# Patient Record
Sex: FEMALE | Race: WHITE
[De-identification: names, ages, dates, MRNs, and addresses within clinical notes are randomized per-mention and may not be internally consistent; named-entity substitution may affect disease eponyms.]

---

## 2018-04-05 ENCOUNTER — HOSPITAL ENCOUNTER (EMERGENCY)
Dept: HOSPITAL 62 - ER | Age: 33
Discharge: HOME | End: 2018-04-05
Payer: COMMERCIAL

## 2018-04-05 VITALS — SYSTOLIC BLOOD PRESSURE: 108 MMHG | DIASTOLIC BLOOD PRESSURE: 86 MMHG

## 2018-04-05 DIAGNOSIS — R63.0: ICD-10-CM

## 2018-04-05 DIAGNOSIS — K52.9: ICD-10-CM

## 2018-04-05 DIAGNOSIS — O21.2: ICD-10-CM

## 2018-04-05 DIAGNOSIS — R10.31: ICD-10-CM

## 2018-04-05 DIAGNOSIS — Z87.891: ICD-10-CM

## 2018-04-05 DIAGNOSIS — Z3A.37: ICD-10-CM

## 2018-04-05 DIAGNOSIS — O26.893: ICD-10-CM

## 2018-04-05 DIAGNOSIS — O99.613: Primary | ICD-10-CM

## 2018-04-05 LAB
ABSOLUTE LYMPHOCYTES# (MANUAL): 0.7 10^3/UL (ref 0.5–4.7)
ABSOLUTE MONOCYTES # (MANUAL): 0.9 10^3/UL (ref 0.1–1.4)
ABSOLUTE NEUTROPHILS# (MANUAL): 16.5 10^3/UL (ref 1.7–8.2)
ADD MANUAL DIFF: YES
ALBUMIN SERPL-MCNC: 3.9 G/DL (ref 3.5–5)
ALP SERPL-CCNC: 157 U/L (ref 38–126)
ALT SERPL-CCNC: 25 U/L (ref 9–52)
ANION GAP SERPL CALC-SCNC: 8 MMOL/L (ref 5–19)
APPEARANCE UR: (no result)
APTT PPP: YELLOW S
AST SERPL-CCNC: 15 U/L (ref 14–36)
BASOPHILS NFR BLD MANUAL: 0 % (ref 0–2)
BILIRUB DIRECT SERPL-MCNC: 0.1 MG/DL (ref 0–0.4)
BILIRUB SERPL-MCNC: 0.4 MG/DL (ref 0.2–1.3)
BILIRUB UR QL STRIP: NEGATIVE
BUN SERPL-MCNC: 7 MG/DL (ref 7–20)
CALCIUM: 9.2 MG/DL (ref 8.4–10.2)
CHLORIDE SERPL-SCNC: 107 MMOL/L (ref 98–107)
CO2 SERPL-SCNC: 23 MMOL/L (ref 22–30)
EOSINOPHIL NFR BLD MANUAL: 0 % (ref 0–6)
ERYTHROCYTE [DISTWIDTH] IN BLOOD BY AUTOMATED COUNT: 12.9 % (ref 11.5–14)
GLUCOSE SERPL-MCNC: 82 MG/DL (ref 75–110)
GLUCOSE UR STRIP-MCNC: NEGATIVE MG/DL
HCT VFR BLD CALC: 40.6 % (ref 36–47)
HGB BLD-MCNC: 13.1 G/DL (ref 12–15.5)
KETONES UR STRIP-MCNC: NEGATIVE MG/DL
LIPASE SERPL-CCNC: 84.1 U/L (ref 23–300)
MCH RBC QN AUTO: 28.3 PG (ref 27–33.4)
MCHC RBC AUTO-ENTMCNC: 32.3 G/DL (ref 32–36)
MCV RBC AUTO: 88 FL (ref 80–97)
MONOCYTES % (MANUAL): 5 % (ref 3–13)
NEUTS BAND NFR BLD MANUAL: 2 % (ref 3–5)
NITRITE UR QL STRIP: NEGATIVE
PH UR STRIP: 5 [PH] (ref 5–9)
PLATELET # BLD: 206 10^3/UL (ref 150–450)
PLATELET COMMENT: ADEQUATE
POTASSIUM SERPL-SCNC: 4.3 MMOL/L (ref 3.6–5)
PROT SERPL-MCNC: 6.5 G/DL (ref 6.3–8.2)
PROT UR STRIP-MCNC: NEGATIVE MG/DL
RBC # BLD AUTO: 4.62 10^6/UL (ref 3.72–5.28)
RBC MORPH BLD: (no result)
SEGMENTED NEUTROPHILS % (MAN): 89 % (ref 42–78)
SODIUM SERPL-SCNC: 138.4 MMOL/L (ref 137–145)
SP GR UR STRIP: 1.02
TOTAL CELLS COUNTED BLD: 100
TOXIC GRANULES BLD QL SMEAR: SLIGHT
UROBILINOGEN UR-MCNC: NEGATIVE MG/DL (ref ?–2)
VARIANT LYMPHS NFR BLD MANUAL: 3 % (ref 13–45)
WBC # BLD AUTO: 18.1 10^3/UL (ref 4–10.5)

## 2018-04-05 PROCEDURE — 76705 ECHO EXAM OF ABDOMEN: CPT

## 2018-04-05 PROCEDURE — 81001 URINALYSIS AUTO W/SCOPE: CPT

## 2018-04-05 PROCEDURE — 80053 COMPREHEN METABOLIC PANEL: CPT

## 2018-04-05 PROCEDURE — 87040 BLOOD CULTURE FOR BACTERIA: CPT

## 2018-04-05 PROCEDURE — 83690 ASSAY OF LIPASE: CPT

## 2018-04-05 PROCEDURE — 36415 COLL VENOUS BLD VENIPUNCTURE: CPT

## 2018-04-05 PROCEDURE — 96361 HYDRATE IV INFUSION ADD-ON: CPT

## 2018-04-05 PROCEDURE — 85025 COMPLETE CBC W/AUTO DIFF WBC: CPT

## 2018-04-05 PROCEDURE — 99284 EMERGENCY DEPT VISIT MOD MDM: CPT

## 2018-04-05 PROCEDURE — 96365 THER/PROPH/DIAG IV INF INIT: CPT

## 2018-04-05 PROCEDURE — 83605 ASSAY OF LACTIC ACID: CPT

## 2018-04-05 PROCEDURE — 82570 ASSAY OF URINE CREATININE: CPT

## 2018-04-05 NOTE — ER DOCUMENT REPORT
ED General





- General


Chief Complaint: Vomiting


Stated Complaint: VOMITING


Time Seen by Provider: 18 09:46


Mode of Arrival: Ambulatory


Information source: Patient


TRAVEL OUTSIDE OF THE [place] IN LAST 30 DAYS: No





- HPI


Notes: 





32 old female  5 para 3 who is 37 weeks pregnant presents today with 

sudden onset nausea vomiting and diarrhea that started 6 hours ago.  Denies any 

new medications, foods, travel.  Patient does has 2 small kids at home, states 

they are not sick.  Patient did not have any antiemetic at home, [facility] told her not to take any Zofran.  Patient has been feeling 

baby move hourly.  States her pregnancy has been uncomplicated denies any 

history of preeclampsia, gestational diabetes.  Denies any chronic issues.  

Reports she has not had an appetite, but she has been trying to keep some 

fluids in her.  Denies fevers, chills,  chest pain,palpitations,  shortness of 

breath, dyspnea,  hematuria,blurred vision, double vision, loss of vision, 

speech changes, LH, dizziness, syncope, headaches, wheezing, ST, URI, neck pain

, weakness, bowel or bladder dysfunction, saddle anesthesia, numbness or 

tingling in bilateral upper or lower extremities equally, muscle paralysis, 

weakness in bilateral upper or lower extremities equally or rash. Denies IV 

drug use.





- Related Data


Allergies/Adverse Reactions: 


 





No Known Allergies Allergy (Verified 18 09:37)


 











Past Medical History





- General


Information source: Patient





- Social History


Smoking Status: Former Smoker


Frequency of alcohol use: None


Drug Abuse: None


Family History: Reviewed & Not Pertinent, DM


Patient has suicidal ideation: No


Patient has homicidal ideation: No





- Past Medical History


Cardiac Medical History: 


   Denies: Hx Coronary Artery Disease, Hx Heart Attack, Hx Hypertension


Pulmonary Medical History: 


   Denies: Hx Asthma, Hx Bronchitis, Hx COPD, Hx Pneumonia


Neurological Medical History: Denies: Hx Cerebrovascular Accident, Hx Seizures


Renal/ Medical History: Reports: Hx Ectopic Pregnancy.  Denies: Hx Peritoneal 

Dialysis


Musculoskeltal Medical History: Denies Hx Arthritis


Psychiatric Medical History: Reports: Hx Anxiety, Hx Depression


Past Surgical History: Reports: Hx Gynecologic Surgery





- Immunizations


Hx Diphtheria, Pertussis, Tetanus Vaccination: Yes





Review of Systems





- Review of Systems


Notes: 





REVIEW OF SYSTEMS:


CONSTITUTIONAL :  Denies fever,  chills, or sweats.  Denies recent illness.


EENT:   Denies eye, ear, throat, or mouth pain or symptoms.  Denies nasal or 

sinus congestion or discharge.  Denies throat, tongue, or mouth swelling or 

difficulty swallowing.


CARDIOVASCULAR:  Denies chest pain.  Denies palpitations or racing or irregular 

heart beat.  Denies ankle edema.


RESPIRATORY:  Denies cough, cold, or chest congestion.  Denies shortness of 

breath, difficulty breathing, or wheezing.


GASTROINTESTINAL:  Denies abdominal pain or distention.  Denies nausea, vomiting

, or diarrhea.  Denies blood in vomitus, stools, or per rectum.  Denies black, 

tarry stools.  Denies constipation. 


GENITOURINARY:  Denies difficulty urinating, painful urination, burning, 

frequency, blood in urine, or discharge.


FEMALE  GENITOURINARY:  Denies vaginal bleeding, heavy or abnormal periods, 

irregular periods.  Denies vaginal discharge or odor. 


MUSCULOSKELETAL:  Denies back or neck pain or stiffness.  Denies joint pain or 

swelling.


SKIN:   Denies rash, lesions or sores.


HEMATOLOGIC :   Denies easy bruising or bleeding.


LYMPHATIC:  Denies swollen, enlarged glands.


NEUROLOGICAL:  Denies confusion or altered mental status.  Denies passing out 

or loss of consciousness.  Denies dizziness or lightheadedness.  Denies 

headache.  Denies weakness or paralysis or loss of use of either side.  Denies 

problems with gait or speech.  Denies sensory loss, numbness, or tingling.  

Denies seizures.


PSYCHIATRIC:  Denies anxiety or stress.  Denies depression, suicidal ideation, 

or homicidal ideation.





ALL OTHER SYSTEMS REVIEWED AND NEGATIVE.











PHYSICAL EXAMINATION:





GENERAL: Well-appearing, well-nourished and in no acute distress.





HEAD: Atraumatic, normocephalic.





EYES: Pupils equal round and reactive to light, extraocular movements intact, 

conjunctiva are normal.





ENT: Nares patent, oropharynx clear without exudates.  Moist mucous membranes.





NECK: Normal range of motion, supple without lymphadenopathy





LUNGS: Breath sounds clear to auscultation bilaterally and equal.  No wheezes 

rales or rhonchi.





HEART: Regular rate and rhythm without murmurs





ABDOMEN: Soft, nondistended abdomen.  Right quadrant tenderness on palpation, 

no rebound no guarding, no rebound.  No masses appreciated.





Female : deferred





Musculoskeletal: Normal range of motion, no pitting or edema.  No cyanosis.





NEUROLOGICAL: Cranial nerves grossly intact.  Normal speech, normal gait.  

Normal sensory, motor exams





PSYCH: Normal mood, normal affect.





SKIN: Warm, Dry, normal turgor, no rashes or lesions noted.

















Dictation was performed using Dragon voice recognition software





Physical Exam





- Vital signs


Vitals: 


 











Temp Pulse Resp BP Pulse Ox


 


 98.5 F   87   14   107/65   98 


 


 18 09:36  18 09:36  18 09:36  18 09:36  18 09:36














Course





- Re-evaluation


Re-evalutation: 





18 11:07


Healthy 32-year-old female who is afebrile presents today with sudden onset 

nausea vomiting and diarrhea.  She reports right lower quadrant abdominal pain.

  Patient reports she has not had any nausea or vomiting in the last 

hour.Dr.Timothy Armijo, surgeon on-call, called to requested at [phone] for a 

consult to assess for appendicitis due to right lower quadrant pain, 

leukocytosis of 18 with bands and the fact that she is 37 weeks pregnant likely 

ultrasound will not be able to conclusively say if patient does or does not 

have an appendicitis.   will consult with me after abdominal 

ultrasound is completed.  [facility], patient's OB/GYN, 

contacted Dr. Cheyenne Alvarez at [phone] to make aware that patient is here, that 

she is experiencing right lower quadrant pain with a leukocytosis of 18.  Dr. Alvarez states that she has seen a high level of gastroenteritis is with her 

pregnant females with right lower quadrant pain, considering that this is new 

onset with nausea vomiting and diarrhea the likelihood of appendicitis is low 

however also would like to wait till after that on ultrasound is read.  Patient 

has had 2 L of fluid and is on maintenance 125ml/hr, states she is feeling much 

better.  Ultrasound shows that the appendix is not visualized, essentially is 

normal and unremarkable.  Dr. Armijo consulted, did not feel that patient 

displayed any signs of peritonitis or appendicitis, patient has improved with 

IV hydration, and although appendix is not visualized, there was no showing of 

free fluid in the abdomen indicating any rupture.  Patient does have an 

appointment tomorrow morning at [facility] at 0 [phone] with Dr. Goldman, 

midwife.  Patient given strict instructions with her and her  regarding 

to return to the emergency room if she experiences any worsening abdominal pain

, nausea vomiting, fever, diarrhea, etc., patient and  verbalized 

understanding of this plan of care and agree with plan of care.  All questions 

answered.  Patient discharged home without incident.








- Vital Signs


Vital signs: 


 











Temp Pulse Resp BP Pulse Ox


 


 98.9 F   76   18   108/86 H  100 


 


 18 15:13  18 15:13  18 15:13  18 15:13  18 15:13














- Laboratory


Result Diagrams: 


 18 09:54





 18 09:54


Laboratory results interpreted by me: 


 











  18





  09:54 09:54


 


WBC  18.1 H 


 


Seg Neuts % (Manual)  89 H 


 


Band Neutrophils %  2 L 


 


Lymphocytes % (Manual)  3 L 


 


Abs Neuts (Manual)  16.5 H 


 


Alkaline Phosphatase   157 H














Discharge





- Discharge


Clinical Impression: 


 Gastroenteritis, Right lower quadrant pain, Pregnancy





Condition: Good


Disposition: HOME, SELF-CARE


Instructions:  Gastroenteritis (adult) (On license of [facility]), Observation for Appendicitis (On license of [facility])


Additional Instructions: 


Gastroenteritis





     You most likely have gastroenteritis.  This is an irritation of the 

stomach and intestinal tract.  It's usually caused by a virus, but can also be 

caused by bacteria, toxins that cause food poisoning, or excessive alcohol 

intake.  Symptoms may include fever, painful abdominal cramps, nausea, vomiting

, and diarrhea.


     Start with small amounts (two to six ounces) of clear liquids (soft drinks

, herb teas, broth, etc).  Try to take fluids frequently even if you are 

vomiting, to prevent dehydration.  When liquids are being consumed successfully

, advance to small amounts of bland food (mashed potato, toast) for 6 - 12 

hours.


     Gastroenteritis rarely requires medication. It goes away by itself. Use 

good handwashing so you don't spread germs. Wash underwear in very hot water.


     If symptoms are severe, talk to the doctor. Call your physician if blood 

appears in your vomitus or stool, if vomiting lasts longer than 24 hours, if 

the abdominal pain worsens or becomes localized to one area, or if you develop 

high fever.








Abdominal Pain





     There are many causes of abdominal pain.  Pain can mean a serious problem 

requiring surgery (such as appendicitis). It can also be an innocent problem 

that goes away on its own (such as a viral infection).  Often, time must pass 

to determine the cause of pain.


     The physician does not feel that hospitalization is necessary, at present. 

Things may change within the next 24 hours. Call the doctor or come back for re-

examination if any problems occur, such as:


     (1) Pain that becomes more severe, steady, or becomes concentrated in one 

specific area.  Also, pain that is more severe with movement or coughing.  


     (2) Vomiting that persists or becomes more frequent.  


     (3) Blood in the vomitus, urine, or bowel movements.  Blood in the stool 

may have a tarry or black appearance.


     (4) Shaking chills or fever greater than 100 degrees F. 


     (5) The abdomen becomes more distended or swollen. 


     (6) Bowel movements cease. 


     (7) Failure to improve as expected.





Return immediately for any new or worsening symptoms.





Follow up with primary care provider, call tomorrow to make followup 

appointment.


Referrals: 


ALEJANDRO GOLDMAN CNM [CERTIFIED NURSE MIDWIFE] - Follow up tomorrow


CHICA SHAHID MD [Primary Care Provider] - Follow up tomorrow (09:15)

## 2018-04-05 NOTE — PDOC CONSULTATION
Consultation


Consult Date: 18


Consult reason:: Abdominal pain nausea diarrhea





History of Present Illness


Admission Date/PCP: 


  





  CHICA SHAHID MD





Patient complains of: Abdominal pain


History of Present Illness: 


MARISELA CARLISLE is a 32 year old female


She was brought by ground rescue also [facility] emergency department 

complaining of abdominal pain nausea and vomiting and diarrhea, the latter 10 

all starting this morning at approximately 4 AM suddenly.  Patient denies 

contact with sick people, history of trauma, previous gastroenterologic 

problems.  Patient seen in the emergency department where she is found to have 

right lower quadrant tenderness.  She had a leukocytosis 18,000.  Surgery was 

consulted because of concern for acute appendicitis.





Of note the patient is 37 weeks single intrauterine pregnancy.  She is a .

  She has a history of ectopic pregnancies in the past.





Past Medical History


Cardiac Medical History: 


   Denies: Coronary Artery Disease, Myocardial Infarction, Hypertension


Pulmonary Medical History: 


   Denies: Asthma, Bronchitis, Chronic Obstructive Pulmonary Disease (COPD), 

Pneumonia


Neurological Medical History: 


   Denies: Seizures


Musculoskeltal Medical History: 


   Denies: Arthritis


Psychiatric Medical History: Reports: Depression


Hematology: 


   Denies: Anemia





Past Surgical History


Past Surgical History: Reports: Other - Gynecologic procedure for ectopic





Social History


Smoking Status: Former Smoker


Frequency of Alcohol Use: None


Hx Recreational Drug Use: No


Hx Prescription Drug Abuse: No





Family History


Family History: Reviewed & Not Pertinent, DM


Parental Family History Reviewed: Yes


Children Family History Reviewed: Yes


Sibling(s) Family History Reviewed.: Yes





Medication/Allergy


Home Medications: 








Prenatal No122/Iron/Folic Acid [Prenatal Multi Tablet] 1 each PO DAILY 17 


Acetaminophen [Tylenol 325 mg Tablet] 650 mg PO Q4HP PRN 18 








Allergies/Adverse Reactions: 


 





No Known Allergies Allergy (Verified 18 09:37)


 











Review of Systems


Eyes: ABSENT: visual disturbances


Ears: ABSENT: hearing changes


Cardiovascular: ABSENT: chest pain, dyspnea on exertion, edema, orthropnea, 

palpitations


Gastrointestinal: PRESENT: other - As per HPI.  ABSENT: abdominal pain, 

constipation, diarrhea, hematemesis, hematochezia, nausea, vomiting


Neurological: ABSENT: abnormal gait, abnormal speech, confusion, dizziness, 

focal weakness, syncope


Psychiatric: ABSENT: anxiety, depression, homidical ideation, suicidal ideation





Physical Exam


Vital Signs: 


 











Temp Pulse Resp BP Pulse Ox


 


 98.5 F   87   14   107/65   98 


 


 18 09:36  18 09:36  18 09:36  18 09:36  18 09:36











General appearance: PRESENT: mild distress


Head exam: PRESENT: normocephalic


Eye exam: PRESENT: EOMI


Mouth exam: PRESENT: dry mucosa


Respiratory exam: PRESENT: chest wall tenderness


Cardiovascular exam: PRESENT: RRR


Pulses: PRESENT: normal carotid pulses, normal radial pulses


GI/Abdominal exam: PRESENT: diminished bowel sounds, other - The abdomen is 

distended consistent with 37 week intrauterine pregnancy.  There are no 

peritoneal signs no rigidity no guarding.  There may be slightly more 

tenderness in the right lower quadrant but mild.  I cannot appreciate any 

masses.





Results


Laboratory Results: 


 





 18 09:54 





 18 09:54 





 











  18





  09:54 09:54 10:50


 


WBC  18.1 H  


 


RBC  4.62  


 


Hgb  13.1  


 


Hct  40.6  


 


MCV  88  


 


MCH  28.3  


 


MCHC  32.3  


 


RDW  12.9  


 


Plt Count  206  


 


Seg Neutrophils %  Not Reportable  


 


Lymphocytes %  Not Reportable  


 


Monocytes %  Not Reportable  


 


Eosinophils %  Not Reportable  


 


Basophils %  Not Reportable  


 


Absolute Neutrophils  Not Reportable  


 


Absolute Lymphocytes  Not Reportable  


 


Absolute Monocytes  Not Reportable  


 


Absolute Eosinophils  Not Reportable  


 


Absolute Basophils  Not Reportable  


 


Sodium   138.4 


 


Potassium   4.3 


 


Chloride   107 


 


Carbon Dioxide   23 


 


Anion Gap   8 


 


BUN   7 


 


Creatinine   0.54 


 


Est GFR ( Amer)   > 60 


 


Est GFR (Non-Af Amer)   > 60 


 


Glucose   82 


 


Calcium   9.2 


 


Total Bilirubin   0.4 


 


AST   15 


 


ALT   25 


 


Alkaline Phosphatase   157 H 


 


Total Protein   6.5 


 


Albumin   3.9 


 


Lipase   84.1 


 


Urine Color    YELLOW


 


Urine Appearance    SLIGHTLY-CLOUDY


 


Urine pH    5.0


 


Ur Specific Gravity    1.020


 


Urine Protein    NEGATIVE


 


Urine Glucose (UA)    NEGATIVE


 


Urine Ketones    NEGATIVE


 


Urine Blood    NEGATIVE


 


Urine Nitrite    NEGATIVE


 


Ur Leukocyte Esterase    NEGATIVE


 


Urine WBC (Auto)    3


 


Urine RBC (Auto)    0














Assessment & Plan





- Diagnosis


(1) Abdominal pain


Qualifiers: 


   Abdominal location: right upper quadrant   Qualified Code(s): R10.11 - Right 

upper quadrant pain   


Is this a current diagnosis for this admission?: Yes   


Plan: 


Sent is in the emergency department complaining of abdominal pain, but less so 

since onset early this morning.  She is being fluid resuscitated.  Peritoneal 

signs, and barely localizes to the right lower quadrant.  Do not believe she 

has peritonitis








ReCommendations:








1.  Continue IV hydration; obtain right lower quadrant ultrasonography





2.  We will follow patient closely with you and reassess after ultrasonography 

performed.  Discussed the above with the emergency room provider.








(2) Intrauterine pregnancy


Is this a current diagnosis for this admission?: Yes   





(3) Tobacco dependence


Is this a current diagnosis for this admission?: Yes   





(4) History of ectopic pregnancy


Is this a current diagnosis for this admission?: Yes   





- Time


Time Spent: 30 to 50 Minutes


Smoking Cessation Education: over 10 minutes


Anticipated discharge: Home

## 2018-04-05 NOTE — RADIOLOGY REPORT (SQ)
EXAM DESCRIPTION:  U/S ABDOMEN LIMITED W/O DOP



COMPLETED DATE/TIME:  4/5/2018 12:43 pm



REASON FOR STUDY:  37wpreg,+leuk,r/oapp,pan,liver,gall



COMPARISON:  None.



TECHNIQUE:  Dynamic and static grayscale images acquired of the abdomen and recorded on PACS. Additio
nal selected color Doppler and spectral images recorded.



LIMITATIONS:  Patient is pregnant.  Gravid uterus partially obscures the abdominal aorta.

Appendix not visualized on exam of the right lower quadrant.



FINDINGS:  PANCREAS: Midline pancreas unremarkable

LIVER: No masses. Echotexture normal.

LIVER VASCULATURE: Normal directional flow of the main portal vein and hepatic veins.

GALLBLADDER: No stones. Normal wall thickness. No pericholecystic fluid.

ULTRASOUND-DETECTED MARCELO'S SIGN: Negative.

INTRAHEPATIC DUCTS AND COMMON DUCT: CBD and intrahepatic ducts normal caliber. No filling defects.

INFERIOR VENA CAVA: Normal flow.

AORTA: No aneurysm.

RIGHT KIDNEY:  Normal size. Normal echogenicity. No solid or suspicious masses. No hydronephrosis. No
 calcifications.

PERITONEAL AND RIGHT PLEURAL SPACE: No ascites or effusions.

OTHER: Appendix not visualized.  Normal size right ovary.



IMPRESSION:  No gallstones, gallbladder wall thickening or pericholecystic fluid.  Negative sonograph
ic Marcelo's sign.

No right hydronephrosis or upper hydroureter.

Appendix not visualized



TECHNICAL DOCUMENTATION:  JOB ID:  [id number]

 [employer]- All Rights Reserved



Reading location - IP/workstation name: [email or web address]

## 2018-04-05 NOTE — ER DOCUMENT REPORT
ED Medical Screen (RME)





- General


Chief Complaint: Vomiting


Stated Complaint: VOMITING


Time Seen by Provider: 04/05/18 09:46


Notes: 





RME DISCLOSURE


I have seen this patient as part of a Rapid Medical Evaluation and, if 

applicable, placed any initially appropriate orders. The patient will be seen 

and fully evaluated, including a full history and physical exam, by a provider (

in Main ED or Fast Track) when a room becomes available.





------------------------------------------------------------------





32-year-old female approximately 37 weeks gestation here with complaints of 

nausea vomiting diarrhea that started approximately 6 hours ago.  He has 

vomited 4 times and had greater than 10 episodes of watery diarrhea.  She has 

some upper abdominal pain but states this started after vomiting and feels it 

may be due to the vomiting episodes.  No known sick contacts.  She ate the same 

last meal as the rest of her family and no one else has become ill.  She was 

told not to take Zofran during her pregnancy so she did not try taking any and 

states that she does not have any Phenergan at home.  She can still feel the 

baby's movements.


TRAVEL OUTSIDE OF THE [place] IN LAST 30 DAYS: No





- Related Data


Allergies/Adverse Reactions: 


 





No Known Allergies Allergy (Verified 04/05/18 09:37)


 











Past Medical History





- Social History


Frequency of alcohol use: None


Drug Abuse: None





- Past Medical History


Cardiac Medical History: 


   Denies: Hx Coronary Artery Disease, Hx Heart Attack, Hx Hypertension


Pulmonary Medical History: 


   Denies: Hx Asthma, Hx Bronchitis, Hx COPD, Hx Pneumonia


Neurological Medical History: Denies: Hx Cerebrovascular Accident, Hx Seizures


Renal/ Medical History: Reports: Hx Ectopic Pregnancy.  Denies: Hx Peritoneal 

Dialysis


Musculoskeltal Medical History: Denies Hx Arthritis


Psychiatric Medical History: Reports: Hx Anxiety, Hx Depression


Past Surgical History: Reports: Hx Gynecologic Surgery





- Immunizations


Hx Diphtheria, Pertussis, Tetanus Vaccination: Yes





Physical Exam





- Vital signs


Vitals: 





 











Temp Pulse Resp BP Pulse Ox


 


 98.5 F   87   14   107/65   98 


 


 04/05/18 09:36  04/05/18 09:36  04/05/18 09:36  04/05/18 09:36  04/05/18 09:36














Course





- Vital Signs


Vital signs: 





 











Temp Pulse Resp BP Pulse Ox


 


 98.5 F   87   14   107/65   98 


 


 04/05/18 09:36  04/05/18 09:36  04/05/18 09:36  04/05/18 09:36  04/05/18 09:36

## 2018-04-06 ENCOUNTER — HOSPITAL ENCOUNTER (INPATIENT)
Dept: HOSPITAL 62 - LC | Age: 33
LOS: 3 days | Discharge: HOME | End: 2018-04-09
Attending: OBSTETRICS & GYNECOLOGY | Admitting: OBSTETRICS & GYNECOLOGY
Payer: COMMERCIAL

## 2018-04-06 DIAGNOSIS — Z3A.37: ICD-10-CM

## 2018-04-06 DIAGNOSIS — F32.9: ICD-10-CM

## 2018-04-06 DIAGNOSIS — O41.03X0: Primary | ICD-10-CM

## 2018-04-06 DIAGNOSIS — Z87.891: ICD-10-CM

## 2018-04-06 DIAGNOSIS — G89.29: ICD-10-CM

## 2018-04-06 LAB
A1 MICROGLOB PLACENTAL VAG QL: NEGATIVE
ADD MANUAL DIFF: NO
APPEARANCE UR: CLEAR
APTT PPP: (no result) S
BARBITURATES UR QL SCN: NEGATIVE
BASOPHILS # BLD AUTO: 0 10^3/UL (ref 0–0.2)
BASOPHILS NFR BLD AUTO: 0.3 % (ref 0–2)
BILIRUB UR QL STRIP: NEGATIVE
EOSINOPHIL # BLD AUTO: 0.1 10^3/UL (ref 0–0.6)
EOSINOPHIL NFR BLD AUTO: 1.1 % (ref 0–6)
ERYTHROCYTE [DISTWIDTH] IN BLOOD BY AUTOMATED COUNT: 12.8 % (ref 11.5–14)
GLUCOSE UR STRIP-MCNC: NEGATIVE MG/DL
HCT VFR BLD CALC: 32.8 % (ref 36–47)
HGB BLD-MCNC: 11 G/DL (ref 12–15.5)
KETONES UR STRIP-MCNC: NEGATIVE MG/DL
LYMPHOCYTES # BLD AUTO: 1.7 10^3/UL (ref 0.5–4.7)
LYMPHOCYTES NFR BLD AUTO: 18.4 % (ref 13–45)
MCH RBC QN AUTO: 29.2 PG (ref 27–33.4)
MCHC RBC AUTO-ENTMCNC: 33.5 G/DL (ref 32–36)
MCV RBC AUTO: 87 FL (ref 80–97)
METHADONE UR QL SCN: NEGATIVE
MONOCYTES # BLD AUTO: 1 10^3/UL (ref 0.1–1.4)
MONOCYTES NFR BLD AUTO: 11 % (ref 3–13)
NEUTROPHILS # BLD AUTO: 6.5 10^3/UL (ref 1.7–8.2)
NEUTS SEG NFR BLD AUTO: 69.2 % (ref 42–78)
NITRITE UR QL STRIP: NEGATIVE
PCP UR QL SCN: NEGATIVE
PH UR STRIP: 6 [PH] (ref 5–9)
PLATELET # BLD: 188 10^3/UL (ref 150–450)
PROT UR STRIP-MCNC: NEGATIVE MG/DL
RBC # BLD AUTO: 3.76 10^6/UL (ref 3.72–5.28)
SP GR UR STRIP: 1
TOTAL CELLS COUNTED % (AUTO): 100 %
URINE AMPHETAMINES SCREEN: NEGATIVE
URINE BENZODIAZEPINES SCREEN: NEGATIVE
URINE COCAINE SCREEN: NEGATIVE
URINE MARIJUANA (THC) SCREEN: NEGATIVE
UROBILINOGEN UR-MCNC: NEGATIVE MG/DL (ref ?–2)
WBC # BLD AUTO: 9.4 10^3/UL (ref 4–10.5)

## 2018-04-06 PROCEDURE — 36415 COLL VENOUS BLD VENIPUNCTURE: CPT

## 2018-04-06 PROCEDURE — C1726 CATH, BAL DIL, NON-VASCULAR: HCPCS

## 2018-04-06 PROCEDURE — 86900 BLOOD TYPING SEROLOGIC ABO: CPT

## 2018-04-06 PROCEDURE — 85025 COMPLETE CBC W/AUTO DIFF WBC: CPT

## 2018-04-06 PROCEDURE — 80307 DRUG TEST PRSMV CHEM ANLYZR: CPT

## 2018-04-06 PROCEDURE — 84112 EVAL AMNIOTIC FLUID PROTEIN: CPT

## 2018-04-06 PROCEDURE — 4A1HXCZ MONITORING OF PRODUCTS OF CONCEPTION, CARDIAC RATE, EXTERNAL APPROACH: ICD-10-PCS | Performed by: OBSTETRICS & GYNECOLOGY

## 2018-04-06 PROCEDURE — 76815 OB US LIMITED FETUS(S): CPT

## 2018-04-06 PROCEDURE — 86901 BLOOD TYPING SEROLOGIC RH(D): CPT

## 2018-04-06 PROCEDURE — 81001 URINALYSIS AUTO W/SCOPE: CPT

## 2018-04-06 PROCEDURE — 85027 COMPLETE CBC AUTOMATED: CPT

## 2018-04-06 PROCEDURE — 86592 SYPHILIS TEST NON-TREP QUAL: CPT

## 2018-04-06 PROCEDURE — 94760 N-INVAS EAR/PLS OXIMETRY 1: CPT

## 2018-04-06 PROCEDURE — 86850 RBC ANTIBODY SCREEN: CPT

## 2018-04-06 RX ADMIN — SODIUM CHLORIDE, SODIUM LACTATE, POTASSIUM CHLORIDE, AND CALCIUM CHLORIDE PRN ML: .6; .31; .03; .02 INJECTION, SOLUTION INTRAVENOUS at 21:02

## 2018-04-06 NOTE — L&D PROGRESS NOTES
=================================================================

PROGRESS NOTES

=================================================================

Datetime Report Generated by CPN: 04/06/2018 20:11

   

   

=================================================================

PROGRESS NOTE

=================================================================

   

Procedures:  Sterile Vag Exam

Procedures- Other:  cooks cath placed without difficulty

Plan:  Continue Present Management; Induction

Informed Consent Obtained:  Induction of Labor

Vital Signs :  Reviewed

Comment:  cooks cath placed without difficulty. 

   Will start pitocin

   

=================================================================

VAGINAL EXAM

=================================================================

   

Dilatation:  2

Dilatation:  2

Effacement:  50

Effacement:  60

Station:  -2

Station:  -1

Contractions:  rare

Contractions:  rare

   

=================================================================

MEMBRANES

=================================================================

   

Membranes:  Intact

Amniotic Fluid Color:  Clear

   

=================================================================

FETUS A

=================================================================

   

FHR - Baseline:  135

Monitoring:  External US

Variability:  Moderate 6-25bpm

Accelerations:  15X15

Decelerations:  None

FHR Category:  Category I

Estimated Fetal Weight (gm):  3200

Fetal Presentation:  Vertex

   

=================================================================

SIGNATURE

=================================================================

   

SIGNATURE:  [id number];[id number];13,[id number]

SIGNATURE:  [id number];14,[id number]

SIGNATURE:  [id number]

SIGNATURE:  14,[id number]

Assignment:  Carlee Guerra MD

Signature:  Electronically signed by Opal Coles CNM on 4/6/2018 at

   20:10  with User ID: [email or web address]

:  Electronically signed by Opal Coles CNM on 4/6/2018 at 20:10  with

   User ID: [email or web address]

## 2018-04-06 NOTE — ADMISSION PHYSICAL
=================================================================



=================================================================

Datetime Report Generated by CPN: 2018 18:12

   

   

=================================================================

CURRENT ADMISSION

=================================================================

   

Prenatal Hx Assessment:  The Prenatal History has been Reviewed and is

   Current

Chief Complaint:  Other

Chief Complaint Other:  bety: 3.8

Indication for Induction:  Oligohydramnios

Admit Impression :  Term, Intrauterine Pregnancy; No Active Labor;

   Intact Membranes; Induction of Labor

Admit Plan:  Admit to Unit; Initiate Labor Induction Protocol

   

=================================================================

ALLERGIES

=================================================================

   

Medication Allergies:  No

Medication Allergies:  No Known Allergies (2018)

Latex:  No Latex Allergies

Food Allergies:  N/A

Environmental Allergies:  N/A

   

=================================================================

OBSTETRICAL HISTORY

=================================================================

   

EDC:  2018 00:00

:  5

Para:  2

Term:  2

:  0

SAB:  1

IAB:  0

Ectopic:  1

Livin

Cesareans:  0

VBACs:  0

Multiple Births:  0

Gestational Diabetes:  No

Rh Sensitization:  No

Incompetent Cervix:  No

JHOAN:  No

Infertility:  No

ART Treatment:  No

Uterine Anomaly:  No

IUGR:  No

Hx Previous C/S:  No

Macrosomia:  No

Hx Loss/Stillborn:  No

PIH:  No

Hx  Death:  No

Placenta Previa/Abruption:  No

Depression/PP Depression:  No

PTL/PROM:  No

Post Partum Hemorrhage:  No

Current Pregnancy Procedures:  Ultrasound

Obstetrical History Comments:  G1 - , , Girl, 40 weeks

   G2 - , , Girl, 39 weeks

   G3 - , Etopic

   G4 - , SAB

   G5 - Current pregnancy

   

=================================================================

***SEE PRENATAL RECORDS***

=================================================================

   

Alcohol:  No

Marijuana :  No

Cocaine:  No

Other Illicit Drugs:  No

Cigarettes:  Former Smoker. [id number]

Cigarette Frequency:  5 - 10 per day

Advised to Stop:  Yes

Cigarette Comments:  Stopped smoking during pregnancy

   

=================================================================

MEDICAL HISTORY

=================================================================

   

Diabetes:  No

Blood Transfusion:  No

Pulmonary Disease (Asthma, TB):  No

Breast Disease:  No

Hypertension:  No

Gyn Surgery:  Yes

Heart Disease:  No

Hosp/Surgery:  No

Autoimmune Disorder:  No

Anesthetic Complications:  No

Kidney Disease:  No

Abnormal Pap Smear:  No

Neuro/Epilepsy:  No

Psychiatric Disorders:  No

Other Medical Diseases:  No

Hepatitis/Liver Disease:  No

Significant Family History:  No

Varicosities/Phlebitis:  No

Trauma/Violence :  No

Thyroid Dysfunction:  No

Medical History Comments:  2016 - Etopic

   

=================================================================

INFECTIOUS HISTORY

=================================================================

   

Gonorrhea:  No

Genital Herpes:  No

Chlamydia:  No

Tuberculosis:  No

Syphilis:  No

Hepatitis:  No

HIV/AIDS Exposure:  No

Rash or Viral Illness:  No

HPV:  No

   

=================================================================

PHYSICAL EXAM

=================================================================

   

General:  Normal

HEENT:  Normal

Neurologic:  Normal

Thyroid:  Deferred

Heart:  Normal

Lungs:  Normal

Breast:  Normal

Back:  Normal

Abdomen:  Normal

Genitourinary Exam:  Normal

Extremities:  Normal

DTRs:  Normal

Pelvic Type:  Adequate

Physical Exam Comments:  pelvis proven 7 lbs 7oz

Vital Signs:  Reviewed

   

=================================================================

VAGINAL EXAM

=================================================================

   

Dilatation:  2

Effacement:  60

Station:  -1

Contraction Comments:  rare

   

=================================================================

MEMBRANES

=================================================================

   

Membranes:  Intact

Amniotic Fluid Color:  Clear

   

=================================================================

FETUS A

=================================================================

   

EGA:  37.1

Monitoring:  External US

FHR- Baseline:  135

Variability:  Moderate 6-25bpm

Accelerations:  15X15

Decelerations:  None

FHR Category:  Category I

Estimated Fetal Weight (gm):  3200

Fetal Presentation:  Vertex

Admit Comment:  Sent over for low bety, bety remained unchanged after

   hydration and repeat sono, mfm recommends delivery. No s/sx ROM,

   active baby.

   Admit to l and d

   gbs neg

   will do cooks cath and pitocin

   Pregnancy uncomplicated, former smoker. 

      

   

=================================================================

PLANS FOR LABOR AND DELIVERY

=================================================================

   

Labor and Delivery:  None

Pain Management:  Epidural

Feeding Preference:  Both

Benefit of Breast Feed Discussed:  Yes

Circumcision:  Yes

   

=================================================================

INFORMED CONSENT

=================================================================

   

Assignment:  Carlee Guerra MD

Signature:  Electronically signed by Opal Coles CNM on 2018 at

   18:11  with User ID: [email or web address]

:  Electronically signed by Opal Coles CNM on 2018 at 18:11  with

   User ID: [email or web address]

## 2018-04-06 NOTE — NON STRESS TEST REPORT
=================================================================

Non Stress Test

=================================================================

Datetime Report Generated by CPN: 04/06/2018 11:40

   

   

=================================================================

DEMOGRAPHIC

=================================================================

   

EGA NST:  37.0

   

=================================================================

INDICATION

=================================================================

   

Indication for Study:  Ordered by Provider

   

=================================================================

MONITORING

=================================================================

   

Monitor Explained:  Monitor Explained; Test Explained; Patient

   Verbalized Understanding

Time on Monitor:  04/05/2018 16:25

Time off Monitor:  04/05/2018 17:36

NST Duration:  71

   

=================================================================

NST INTERVENTIONS

=================================================================

   

NST Interventions:  PO Hydration; Reposition Patient

Physician Notified NST:  Dr. Antonio

BABY A:  [id number]

   

=================================================================

BABY A

=================================================================

   

Contraction Frequency :  None

FHR Baseline :  145

Accelerations :  15X15

Decelerations :  None

Variability :  Moderate 6-25bpm

NST Review:  Meets Criteria for Reactive NST

NST Review and Verified By :  KIKA Zhu Results:  Reactive

   

=================================================================

NST REPORT

=================================================================

   

Report Trigger:  Send Report

## 2018-04-06 NOTE — NON STRESS TEST REPORT
=================================================================

Non Stress Test

=================================================================

Datetime Report Generated by CPN: 04/06/2018 12:52

   

   

=================================================================

DEMOGRAPHIC

=================================================================

   

EGA NST:  37.1

   

=================================================================

INDICATION

=================================================================

   

Indication for Study (NST) Other:  LC - decreased ARNAV

   

=================================================================

MONITORING

=================================================================

   

Monitor Explained:  Monitor Explained; Test Explained; Patient

   Verbalized Understanding

Time on Monitor:  04/06/2018 11:42

Time off Monitor:  04/06/2018 12:46

NST Duration:  64

   

=================================================================

NST INTERVENTIONS

=================================================================

   

NST Interventions:  PO Hydration; IV Fluids; Reposition Patient

Physician Notified NST:  Dr Guerra

   

=================================================================

BABY A

=================================================================

   

Fetal Movement :  Present

Contraction Frequency :  0

Accelerations :  15X15

Decelerations :  None

Variability :  Moderate 6-25bpm

NST Review:  Meets Criteria for Reactive NST

NST Review and Verified By :  D Bellavance RNC

NST Results:  Reactive

   

=================================================================

NST REPORT

=================================================================

   

Report Trigger:  Send Report

## 2018-04-06 NOTE — RADIOLOGY REPORT (SQ)
EXAM DESCRIPTION:  U/S OB LIMITED



COMPLETED DATE/TIME:  4/6/2018 5:13 pm



REASON FOR STUDY:  need ARNAV and SDP/MVP - maximum pocket



COMPARISON:  None.



TECHNIQUE:  Limited transabdominal grayscale ultrasound for evaluation of specific requested obstetri
jordan parameters.



LIMITATIONS:  None.



FINDINGS:  ARNAV:  4.2 cm  MVP: 2.24 cm.

FHR: 143 beats per minute.

PRESENTATION: Cephalic.

OTHER: Anterior placenta.



IMPRESSION:  LIMITED OBSTETRICAL ULTRASOUND WITH MEASURED PARAMETERS DELINEATED ABOVE.

Trimester of pregnancy: Third trimester - 28 weeks to delivery.



TECHNICAL DOCUMENTATION:  JOB ID:  [id number]

[id number]

 2011 [employer]- All Rights Reserved



Reading location - IP/workstation name: [employer]

## 2018-04-07 PROCEDURE — 10907ZC DRAINAGE OF AMNIOTIC FLUID, THERAPEUTIC FROM PRODUCTS OF CONCEPTION, VIA NATURAL OR ARTIFICIAL OPENING: ICD-10-PCS | Performed by: OBSTETRICS & GYNECOLOGY

## 2018-04-07 PROCEDURE — 3E033VJ INTRODUCTION OF OTHER HORMONE INTO PERIPHERAL VEIN, PERCUTANEOUS APPROACH: ICD-10-PCS | Performed by: OBSTETRICS & GYNECOLOGY

## 2018-04-07 RX ADMIN — FERROUS SULFATE TAB 325 MG (65 MG ELEMENTAL FE) SCH MG: 325 (65 FE) TAB at 17:42

## 2018-04-07 RX ADMIN — FERROUS SULFATE TAB 325 MG (65 MG ELEMENTAL FE) SCH MG: 325 (65 FE) TAB at 16:06

## 2018-04-07 RX ADMIN — SODIUM CHLORIDE, SODIUM LACTATE, POTASSIUM CHLORIDE, AND CALCIUM CHLORIDE PRN ML: .6; .31; .03; .02 INJECTION, SOLUTION INTRAVENOUS at 04:20

## 2018-04-07 RX ADMIN — FAMOTIDINE SCH MG: 20 TABLET, FILM COATED ORAL at 22:58

## 2018-04-07 RX ADMIN — IBUPROFEN SCH MG: 800 TABLET, FILM COATED ORAL at 16:06

## 2018-04-07 RX ADMIN — DOCUSATE SODIUM SCH MG: 100 CAPSULE, LIQUID FILLED ORAL at 16:06

## 2018-04-07 RX ADMIN — FAMOTIDINE SCH MG: 20 TABLET, FILM COATED ORAL at 16:06

## 2018-04-07 RX ADMIN — ACETAMINOPHEN AND CODEINE PHOSPHATE PRN EACH: 300; 30 TABLET ORAL at 20:45

## 2018-04-07 RX ADMIN — Medication SCH CAP: at 16:06

## 2018-04-07 RX ADMIN — SENNOSIDES, DOCUSATE SODIUM SCH EACH: 50; 8.6 TABLET, FILM COATED ORAL at 16:06

## 2018-04-07 RX ADMIN — IBUPROFEN SCH MG: 800 TABLET, FILM COATED ORAL at 22:57

## 2018-04-07 RX ADMIN — ACETAMINOPHEN AND CODEINE PHOSPHATE PRN EACH: 300; 30 TABLET ORAL at 15:42

## 2018-04-07 RX ADMIN — DOCUSATE SODIUM SCH MG: 100 CAPSULE, LIQUID FILLED ORAL at 17:43

## 2018-04-07 NOTE — DELIVERY SUMMARY
=================================================================

Del Sum A-C

=================================================================

Datetime Report Generated by CPN: 2018 14:45

   

   

=================================================================

DELIVERY PERSONNEL

=================================================================

   

DELIVERY PERSONNEL:  [id number]

Delivery Doctor::  Cheyenne Alvarez MD

Labor and Delivery Nurse::  eNli Gan RN

Labor and Delivery Nurse::  Emma Kaye RN

Scrub Tech/CNA:  Perfecto Derrick, CST

   

=================================================================

MATERNAL INFORMATION

=================================================================

   

Delivery Anesthesia:  Epidural

Medications After Delivery:  Pitocin Bolus-Please Comment

Meds After Delivery Comment:  pitocin bolusing per order

Estimated  Blood Loss (ml):  100

Maternal Complications:  None

   

=================================================================

LABOR SUMMARY

=================================================================

   

EDC:  2018 00:00

No. Babies in Womb:  1

 Attempted:  No

Labor Anesthesia:  Epidural

   

=================================================================

LABOR INFORMATION

=================================================================

   

Reason for Induction:  Oligohydramnios

Cervical Ripening Agents:  Nathan Balloon

Oxytocin:  Induction

Group B Beta Strep:  negative

Antibiotics # of Doses:  0

Steroids Given:  None

Reason Steroids Not Administered:  Not Applicable

   

=================================================================

MEMBRANES

=================================================================

   

Membranes Rupture Method:  Artificial

Rupture of Membranes:  2018 07:37

Length of Rupture (hr):  1.73

Amniotic Fluid Color:  Clear

Amniotic Fluid Amount:  Small

Amniotic Fluid Odor:  Normal

   

=================================================================

STAGES OF LABOR

=================================================================

   

Stage 3 hr:  0

Stage 3 min:  3

   

=================================================================

VAGINAL DELIVERY

=================================================================

   

Episiotomy:  None

Laceration #1:  None

Laceration Extension #1:  N/A

Laceration Repair:  Not Applicable

Sponge Count Correct:  N/A; Vaginal Sweep Performed

Sharps Count Correct:  N/A

   

=================================================================

CSECTION DELIVERY

=================================================================

   

Primary Indication:  N/A

Secondary Indication:  N/A

CSection Incidence:  N/A

Labor:  N/A

Elective:  N/A

CSection Incision:  N/A

   

=================================================================

BABY A INFORMATION

=================================================================

   

Infant Delivery Date/Time:  2018 09:21

Method of Delivery:  Vaginal

Born in Route :  No

:  N/A

Forceps:  N/A

Vacuum Extraction:  N/A

Shoulder Dystocia :  No

   

=================================================================

PRESENTATION/POSITION BABY A

=================================================================

   

Presentation:  Cephalic

Cephalic Presentation:  Vertex

Vertex Position:  OA

Breech Presentation:  N/A

   

=================================================================

PLACENTA INFORMATION BABY A

=================================================================

   

Placenta Delivery Time :  2018 09:24

Placenta Method of Delivery:  Spontaneous

Placenta Status:  Delivered

   

=================================================================

APGAR SCORES BABY A

=================================================================

   

Heart Rate 1 min:  >100 bpm

Resp Effort 1 min:  Good Cry

Reflex Irritability 1 min:  Cough or Sneeze or Pulls Away

Muscle Tone 1 min:  Active Motion

Color 1 min:  Body Pink, Extremities Blue

Resuscitation Effort 1 min:  Tactile Stimulation

APGAR SCORE 1 MIN:  9

Heart Rate 5 min:  >100 bpm

Resp Effort 5 min:  Good Cry

Reflex Irritability 5 min:  Cough or Sneeze or Pulls Away

Muscle Tone 5 min:  Active Motion

Color 5 min:  Body Pink, Extremities Blue

Resuscitation Effort 5 min:  Tactile Stimulation

APGAR SCORE 5 MIN:  9

   

=================================================================

INFANT INFORMATION BABY A

=================================================================

   

Gestational Age at Delivery:  37.2

Gestational Status:  Early Term- 37- 38.6 Weeks

Infant Outcome :  Liveborn

Infant Condition :  Stable

Infant Sex:  Male

   

=================================================================

IDENTIFICATION BABY A

=================================================================

   

Infant Verification Date/Time:  2018 10:29

ID Band Number:  [id number]

Mother's Name Verified:  Yes

Infant Medical Record Number:  [id number]

RN Verifying Infant:  MIGUEL Bonilla

   

=================================================================

WEIGHT/LENGTH BABY A

=================================================================

   

Infant Birthweight (gm):  2735

Infant Weight (lb):  6

Infant Weight (oz):  0

Infant Length (in):  19.00

Infant Length (cm):  48.26

   

=================================================================

CORD INFORMATION BABY A

=================================================================

   

No. Cord Vessels:  3

Nuchal Cord :  Around Neck x1, Loose

Cord Blood Taken:  Yes-For Storage (Mom's Blood type +)

Infant Suction:  Mouth; Nose

   

=================================================================

ASSESSMENT BABY A

=================================================================

   

Infant Complications:  Oligohydramnios

Physical Findings at Delivery:  Within Normal Limits

Infant Respirations:  Appears Normal

Skin to Skin:  Yes

Infant Care By:  MOODY Kaye RN

Transferred To:  Remains with Mother

   

=================================================================

BABY B INFORMATION

=================================================================

   

 :  N/A

   

=================================================================

SIGNATURES

=================================================================

   

Signature:  Electronically signed by Cheyenne Alvarez MD (ANDDO) on

   2018 at 09:40  with User ID: [email or web address]

## 2018-04-07 NOTE — WARNING SIGNS IN BABIES
=================================================================

VOD Warning Signs

=================================================================

Datetime Report Generated by [facility]: 04/07/2018 11:39

   

VOD#608 -Warning Signs in Babies:  Needs to be viewed.    (02/14/2018

   19:23:Neli Gan RN)

## 2018-04-07 NOTE — L&D PROGRESS NOTES
=================================================================

PROGRESS NOTES

=================================================================

Datetime Report Generated by CPN: 2018 08:24

   

   

=================================================================

PROGRESS NOTE

=================================================================

   

Impression:  Normal Progression of Labor

Procedures:  Artificial ROM; Sterile Vag Exam

Plan:  Continue Present Management; Induction

Informed Consent Obtained:  Vaginal Delivery; Risks, Benefits and

   Alternatives Discussed

Comment:  cvx 5/75/-1.  AROM clear fluid at 0740.  Pt comfortable with

   epidural. Anticipate 

   

=================================================================

FETUS A

=================================================================

   

FHR - Baseline:  125

Monitoring:  External US

Variability:  Moderate 6-25bpm

Accelerations:  15X15

Decelerations:  None

FHR Category:  Category I

   

=================================================================

FETUS C

=================================================================

   

SIGNATURE:  [id number];[id number];[id number]

Signature:  Electronically signed by Carlee Guerra MD ([facility]) on

   2018 at 08:23  with User ID: [email or web address]

## 2018-04-08 LAB
ERYTHROCYTE [DISTWIDTH] IN BLOOD BY AUTOMATED COUNT: 12.6 % (ref 11.5–14)
HCT VFR BLD CALC: 28.2 % (ref 36–47)
HGB BLD-MCNC: 9.5 G/DL (ref 12–15.5)
MCH RBC QN AUTO: 29.5 PG (ref 27–33.4)
MCHC RBC AUTO-ENTMCNC: 33.8 G/DL (ref 32–36)
MCV RBC AUTO: 87 FL (ref 80–97)
PLATELET # BLD: 146 10^3/UL (ref 150–450)
RBC # BLD AUTO: 3.23 10^6/UL (ref 3.72–5.28)
WBC # BLD AUTO: 10 10^3/UL (ref 4–10.5)

## 2018-04-08 RX ADMIN — SENNOSIDES, DOCUSATE SODIUM SCH EACH: 50; 8.6 TABLET, FILM COATED ORAL at 10:03

## 2018-04-08 RX ADMIN — FERROUS SULFATE TAB 325 MG (65 MG ELEMENTAL FE) SCH MG: 325 (65 FE) TAB at 10:03

## 2018-04-08 RX ADMIN — DOCUSATE SODIUM SCH MG: 100 CAPSULE, LIQUID FILLED ORAL at 10:03

## 2018-04-08 RX ADMIN — IBUPROFEN SCH MG: 800 TABLET, FILM COATED ORAL at 22:19

## 2018-04-08 RX ADMIN — FAMOTIDINE SCH MG: 20 TABLET, FILM COATED ORAL at 22:21

## 2018-04-08 RX ADMIN — ACETAMINOPHEN AND CODEINE PHOSPHATE PRN EACH: 300; 30 TABLET ORAL at 10:18

## 2018-04-08 RX ADMIN — ACETAMINOPHEN AND CODEINE PHOSPHATE PRN EACH: 300; 30 TABLET ORAL at 04:01

## 2018-04-08 RX ADMIN — ACETAMINOPHEN AND CODEINE PHOSPHATE PRN EACH: 300; 30 TABLET ORAL at 17:05

## 2018-04-08 RX ADMIN — DOCUSATE SODIUM SCH MG: 100 CAPSULE, LIQUID FILLED ORAL at 17:06

## 2018-04-08 RX ADMIN — FERROUS SULFATE TAB 325 MG (65 MG ELEMENTAL FE) SCH MG: 325 (65 FE) TAB at 17:06

## 2018-04-08 RX ADMIN — IBUPROFEN SCH MG: 800 TABLET, FILM COATED ORAL at 14:16

## 2018-04-08 RX ADMIN — Medication SCH CAP: at 10:02

## 2018-04-08 RX ADMIN — IBUPROFEN SCH MG: 800 TABLET, FILM COATED ORAL at 07:28

## 2018-04-08 RX ADMIN — FAMOTIDINE SCH MG: 20 TABLET, FILM COATED ORAL at 10:02

## 2018-04-08 NOTE — PDOC PROGRESS REPORT
Subjective-OB


Progress Note for:: 04/08/18


Subjective: 





Doing well, c/o of cramping on left side, bottle feeding, wearing bra, voiding





Physical Exam (OB)


Vital Signs: 


 











Temp Pulse Resp BP Pulse Ox


 


 98.0 F   50 L  15   106/56 L  97 


 


 04/08/18 08:00  04/08/18 08:00  04/08/18 08:00  04/08/18 08:00  04/08/18 08:00








 Intake & Output











 04/07/18 04/08/18 04/09/18





 06:59 06:59 06:59


 


Weight 62.1 kg  














- Lochia


Lochia Amount: Scant < 10 ml


Lochia Color: Rubra/Red





- Abdomen


Description: Soft, Flat


Hernia Present: No


Fundal Description: Firm, Midline


Fundal Height: u/u - u/2





Objective-Diagnostic


Laboratory: 


 





 04/08/18 07:09 





 











  04/08/18





  07:09


 


WBC  10.0


 


RBC  3.23 L


 


Hgb  9.5 L


 


Hct  28.2 L


 


MCV  87


 


MCH  29.5


 


MCHC  33.8


 


RDW  12.6


 


Plt Count  146 L














Assessment and Plan(PN)





- Assessment and Plan


(1) Vaginal delivery


Is this a current diagnosis for this admission?: Yes   





(2) Oligohydramnios


Qualifiers: 


   Fetus number: single or unspecified fetus 


Is this a current diagnosis for this admission?: Yes   





(3) Depression


Qualifiers: 


   Depression Type: unspecified   Qualified Code(s): F32.9 - Major depressive 

disorder, single episode, unspecified   


Is this a current diagnosis for this admission?: Yes   





(4) Chronic pain


Qualifiers: 


   Chronic pain type: other chronic pain   Qualified Code(s): G89.29 - Other 

chronic pain   


Is this a current diagnosis for this admission?: Yes   





(5) Intrauterine pregnancy


Is this a current diagnosis for this admission?: Yes   





- Time Spent with Patient


Time with patient: Less than 15 minutes


Medications reviewed and adjusted accordingly: Yes





- Disposition


Anticipated Discharge: Home


Within: within 24 hours

## 2018-04-09 VITALS — SYSTOLIC BLOOD PRESSURE: 112 MMHG | DIASTOLIC BLOOD PRESSURE: 59 MMHG

## 2018-04-09 RX ADMIN — Medication SCH CAP: at 10:19

## 2018-04-09 RX ADMIN — FAMOTIDINE SCH MG: 20 TABLET, FILM COATED ORAL at 10:20

## 2018-04-09 RX ADMIN — IBUPROFEN SCH MG: 800 TABLET, FILM COATED ORAL at 05:10

## 2018-04-09 RX ADMIN — ACETAMINOPHEN AND CODEINE PHOSPHATE PRN EACH: 300; 30 TABLET ORAL at 02:47

## 2018-04-09 RX ADMIN — SENNOSIDES, DOCUSATE SODIUM SCH EACH: 50; 8.6 TABLET, FILM COATED ORAL at 10:20

## 2018-04-09 RX ADMIN — DOCUSATE SODIUM SCH MG: 100 CAPSULE, LIQUID FILLED ORAL at 10:20

## 2018-04-09 RX ADMIN — FERROUS SULFATE TAB 325 MG (65 MG ELEMENTAL FE) SCH MG: 325 (65 FE) TAB at 10:19

## 2018-04-09 RX ADMIN — ACETAMINOPHEN AND CODEINE PHOSPHATE PRN EACH: 300; 30 TABLET ORAL at 08:39

## 2018-04-09 NOTE — PDOC DISCHARGE SUMMARY
Final Diagnosis


Discharge Date: 18





- Final Diagnosis


(1) Vaginal delivery


Is this a current diagnosis for this admission?: Yes   





(2) Oligohydramnios


Is this a current diagnosis for this admission?: Yes   





(3) Depression


Is this a current diagnosis for this admission?: Yes   





(4) Chronic pain


Is this a current diagnosis for this admission?: Yes   





(5) Intrauterine pregnancy


Is this a current diagnosis for this admission?: Yes   





Discharge Data





- Discharge Medication


Home Medications: 








Prenatal No122/Iron/Folic Acid [Prenatal Multi Tablet] 1 each PO DAILY 17 








Gestational Age: 37.2


Reason(s) for Admission: Induction of Labor


Admission Note: 





oligohydramnios


Prenatal Procedures: NST, Ultrasound


Intrapartum Procedure(s): Spontaneous Vaginal Delivery





- [place] Data


  ** Baby 1 Male


Apgar at 1 minute: 9


Apgar at 5 minutes: 9


Weight: 2.722 kg


Home with Mother: Yes


Complications: No





- Diagnosis Test


Laboratory: 


 











Temp Pulse Resp BP Pulse Ox


 


 98.2 F   57 L  14   112/59 L  98 


 


 18 08:25  18 08:25  18 08:25  18 08:25  18 08:25








 











  18





  11:45 18:05 07:09


 


RBC   3.76  3.23 L


 


Hgb   11.0 L D  9.5 L


 


Hct   32.8 L  28.2 L


 


Urine Opiates Screen  NEGATIVE  














- Discharge information/Instructions


Discharge Activity: Activity As Tolerated, No Lifting/Push/Pulling, Pelvic Rest


Discharge Diet: As Tolerated, Regular


Disposition: HOME, SELF-CARE


Follow up with: [facility]


in: 4, Weeks

## 2018-04-09 NOTE — PDOC PROGRESS REPORT
Subjective-OB


Progress Note for:: 04/09/18


Subjective: 





Feeling better today, holding baby, bottle feeding, voiding, mild menstrual 

cramping, mod lochia





Physical Exam (OB)


Vital Signs: 


 











Temp Pulse Resp BP Pulse Ox


 


 98.2 F   57 L  14   112/59 L  98 


 


 04/09/18 08:25  04/09/18 08:25  04/09/18 08:25  04/09/18 08:25  04/09/18 08:25








 Intake & Output











 04/08/18 04/09/18 04/10/18





 06:59 06:59 06:59


 


Intake Total  350 


 


Balance  350 














- Lochia


Lochia Amount: Small 10-25 ml


Lochia Color: Rubra/Red





- Abdomen


Description: Soft, Round


Hernia Present: No


Fundal Description: Firm, Midline


Fundal Height: u/u - u/2





Objective-Diagnostic


Laboratory: 


 





 04/08/18 07:09 











Assessment and Plan(PN)





- Assessment and Plan


(1) Vaginal delivery


Is this a current diagnosis for this admission?: Yes   





(2) Oligohydramnios


Qualifiers: 


   Fetus number: single or unspecified fetus 


Is this a current diagnosis for this admission?: Yes   





(3) Depression


Qualifiers: 


   Depression Type: unspecified   Qualified Code(s): F32.9 - Major depressive 

disorder, single episode, unspecified   


Is this a current diagnosis for this admission?: Yes   





(4) Chronic pain


Qualifiers: 


   Chronic pain type: other chronic pain   Qualified Code(s): G89.29 - Other 

chronic pain   


Is this a current diagnosis for this admission?: Yes   





(5) Intrauterine pregnancy


Is this a current diagnosis for this admission?: Yes   





- Time Spent with Patient


Medications reviewed and adjusted accordingly: Yes





- Disposition


Anticipated Discharge: Home


Within: Other - home today

## 2018-05-18 LAB
APPEARANCE UR: CLEAR
APTT PPP: (no result) S
BILIRUB UR QL STRIP: NEGATIVE
ERYTHROCYTE [DISTWIDTH] IN BLOOD BY AUTOMATED COUNT: 14.2 % (ref 11.5–14)
GLUCOSE UR STRIP-MCNC: NEGATIVE MG/DL
HCT VFR BLD CALC: 41.5 % (ref 36–47)
HGB BLD-MCNC: 13.5 G/DL (ref 12–15.5)
KETONES UR STRIP-MCNC: NEGATIVE MG/DL
MCH RBC QN AUTO: 28.1 PG (ref 27–33.4)
MCHC RBC AUTO-ENTMCNC: 32.5 G/DL (ref 32–36)
MCV RBC AUTO: 86 FL (ref 80–97)
NITRITE UR QL STRIP: NEGATIVE
PH UR STRIP: 6 [PH] (ref 5–9)
PLATELET # BLD: 200 10^3/UL (ref 150–450)
PROT UR STRIP-MCNC: NEGATIVE MG/DL
RBC # BLD AUTO: 4.81 10^6/UL (ref 3.72–5.28)
SP GR UR STRIP: 1
UROBILINOGEN UR-MCNC: NEGATIVE MG/DL (ref ?–2)
WBC # BLD AUTO: 7.4 10^3/UL (ref 4–10.5)

## 2018-05-24 ENCOUNTER — HOSPITAL ENCOUNTER (OUTPATIENT)
Dept: HOSPITAL 62 - OROUT | Age: 33
Discharge: HOME | End: 2018-05-24
Attending: OBSTETRICS & GYNECOLOGY
Payer: COMMERCIAL

## 2018-05-24 VITALS — SYSTOLIC BLOOD PRESSURE: 108 MMHG | DIASTOLIC BLOOD PRESSURE: 70 MMHG

## 2018-05-24 DIAGNOSIS — Z30.2: Primary | ICD-10-CM

## 2018-05-24 DIAGNOSIS — F17.210: ICD-10-CM

## 2018-05-24 DIAGNOSIS — N83.8: ICD-10-CM

## 2018-05-24 PROCEDURE — 81025 URINE PREGNANCY TEST: CPT

## 2018-05-24 PROCEDURE — 88302 TISSUE EXAM BY PATHOLOGIST: CPT

## 2018-05-24 PROCEDURE — 81005 URINALYSIS: CPT

## 2018-05-24 PROCEDURE — 58661 LAPAROSCOPY REMOVE ADNEXA: CPT

## 2018-05-24 PROCEDURE — 36415 COLL VENOUS BLD VENIPUNCTURE: CPT

## 2018-05-24 PROCEDURE — 85027 COMPLETE CBC AUTOMATED: CPT

## 2018-05-24 NOTE — OPERATIVE REPORT E
Operative Report



NAME: MARISELA CARLISLE

MRN:  [id number]          : 1985 AGE:  32Y

DATE OF SURGERY: 2018                        ROOM:



PREOPERATIVE DIAGNOSIS:

Undesired fertility.



POSTOPERATIVE DIAGNOSIS:

Undesired fertility.



SURGEON:

KAL KENT M.D.



ANESTHESIA:

Dr. Escamilla with general.



FINDINGS:

Normal uterus, tubes, and ovaries.



COMPLICATIONS:

None.



ESTIMATED BLOOD LOSS:

20 mL.



SPECIMENS REMOVED:

Bilateral fallopian tubes.



PROCEDURE:

Laparoscopic bilateral salpingectomy.



PROCEDURE IN DETAIL:

the patient was taken to the operating room and prepared and draped in a

normal sterile fashion in the dorsal lithotomy position.  Under sterile

condition, an in-and-out catheter was performed of approximately 20 mL of

clear urine.  Sterile speculum was placed in the vagina and the anterior

lip of the cervix was grasped with a single-toothed tenaculum and a Hulka

clamp was placed through the cervix for uterine manipulation.  A

single-toothed tenaculum was removed.  Speculum was removed and attention

was turned to the upper portion of the case.  The gloves were changed. 

Skin incision was made at the umbilicus through which a Veress needle was

introduced and the peritoneal cavity placement was confirmed with free

flow of sterile water through the needle.  The abdomen was then

insufflated with approximately 2 L of CO2 gas.  The needle was removed,

and through this incision, a 5 mm trocar was placed to accommodate the

camera.  The camera was introduced and the peritoneal cavity placement was

confirmed.  Under direct visualization, two 5 mm ports were placed in the

right and left lower quadrants.  The patient was placed in steep

Trendelenburg.  The bowel was swept away.  Beginning with the right

fallopian, this was grasped with an atraumatic grasper and held in place

by the surgical assistant.  The LigaSure was then introduced, and

following the mesosalpinx starting at the uterine fundus, the fallopian

tube was ligated using a LigaSure device until the fallopian tube was

freed.  The fallopian tube was then placed into the anterior cul-de-sac

for removal with a trocar.  The procedure was repeated on the patient's

left fallopian tube without difficulty and this fallopian tube was removed

through the trocar.  The first fallopian tube was taken from the anterior

cul-de-sac and removed with the trocar in whole without difficulty.  The

abdomen was then surveyed and found to have no further abnormal findings

and the abdomen was deflated of CO2 gas through the umbilical port.  The

umbilical port was then removed.  The skin was closed at all 3 sites using

4-0 Vicryl.  The patient tolerated the procedure well.  Sponge, lap, and

needle counts were correct x2, and the patient was taken to recovery in

stable condition.



DICTATING PHYSICIAN:  KAL KENT M.D.





[id number]                  DT: 2018    0939

PHY#: [id number]            DD: 2018    0936

ID:   [id number]           JOB#: [id number]       ACCT: [id number]



cc:KAL KENT M.D.

>







MTDD

## 2019-08-16 ENCOUNTER — HOSPITAL ENCOUNTER (EMERGENCY)
Dept: HOSPITAL 62 - ER | Age: 34
Discharge: HOME | End: 2019-08-16
Payer: COMMERCIAL

## 2019-08-16 VITALS — DIASTOLIC BLOOD PRESSURE: 64 MMHG | SYSTOLIC BLOOD PRESSURE: 101 MMHG

## 2019-08-16 DIAGNOSIS — M54.10: Primary | ICD-10-CM

## 2019-08-16 DIAGNOSIS — F17.200: ICD-10-CM

## 2019-08-16 DIAGNOSIS — R07.89: ICD-10-CM

## 2019-08-16 LAB
ADD MANUAL DIFF: NO
ALBUMIN SERPL-MCNC: 3.9 G/DL (ref 3.5–5)
ALP SERPL-CCNC: 45 U/L (ref 38–126)
ANION GAP SERPL CALC-SCNC: 8 MMOL/L (ref 5–19)
APPEARANCE UR: CLEAR
APTT PPP: COLORLESS S
AST SERPL-CCNC: 18 U/L (ref 14–36)
BASOPHILS # BLD AUTO: 0.1 10^3/UL (ref 0–0.2)
BASOPHILS NFR BLD AUTO: 1.1 % (ref 0–2)
BILIRUB DIRECT SERPL-MCNC: 0.1 MG/DL (ref 0–0.4)
BILIRUB SERPL-MCNC: 0.5 MG/DL (ref 0.2–1.3)
BILIRUB UR QL STRIP: NEGATIVE
BUN SERPL-MCNC: 2 MG/DL (ref 7–20)
CALCIUM: 8.8 MG/DL (ref 8.4–10.2)
CHLORIDE SERPL-SCNC: 106 MMOL/L (ref 98–107)
CO2 SERPL-SCNC: 27 MMOL/L (ref 22–30)
EOSINOPHIL # BLD AUTO: 0.1 10^3/UL (ref 0–0.6)
EOSINOPHIL NFR BLD AUTO: 1.7 % (ref 0–6)
ERYTHROCYTE [DISTWIDTH] IN BLOOD BY AUTOMATED COUNT: 13.3 % (ref 11.5–14)
GLUCOSE SERPL-MCNC: 42 MG/DL (ref 75–110)
GLUCOSE UR STRIP-MCNC: NEGATIVE MG/DL
HCT VFR BLD CALC: 39.8 % (ref 36–47)
HGB BLD-MCNC: 13.3 G/DL (ref 12–15.5)
KETONES UR STRIP-MCNC: NEGATIVE MG/DL
LYMPHOCYTES # BLD AUTO: 1.7 10^3/UL (ref 0.5–4.7)
LYMPHOCYTES NFR BLD AUTO: 25.9 % (ref 13–45)
MCH RBC QN AUTO: 30 PG (ref 27–33.4)
MCHC RBC AUTO-ENTMCNC: 33.5 G/DL (ref 32–36)
MCV RBC AUTO: 90 FL (ref 80–97)
MONOCYTES # BLD AUTO: 0.7 10^3/UL (ref 0.1–1.4)
MONOCYTES NFR BLD AUTO: 10.1 % (ref 3–13)
NEUTROPHILS # BLD AUTO: 4.1 10^3/UL (ref 1.7–8.2)
NEUTS SEG NFR BLD AUTO: 61.2 % (ref 42–78)
NITRITE UR QL STRIP: NEGATIVE
PH UR STRIP: 6 [PH] (ref 5–9)
PLATELET # BLD: 209 10^3/UL (ref 150–450)
POTASSIUM SERPL-SCNC: 3.4 MMOL/L (ref 3.6–5)
PROT SERPL-MCNC: 5.8 G/DL (ref 6.3–8.2)
PROT UR STRIP-MCNC: NEGATIVE MG/DL
RBC # BLD AUTO: 4.43 10^6/UL (ref 3.72–5.28)
SP GR UR STRIP: 1
TOTAL CELLS COUNTED % (AUTO): 100 %
UROBILINOGEN UR-MCNC: NEGATIVE MG/DL (ref ?–2)
WBC # BLD AUTO: 6.7 10^3/UL (ref 4–10.5)

## 2019-08-16 PROCEDURE — 85025 COMPLETE CBC W/AUTO DIFF WBC: CPT

## 2019-08-16 PROCEDURE — 93005 ELECTROCARDIOGRAM TRACING: CPT

## 2019-08-16 PROCEDURE — 80053 COMPREHEN METABOLIC PANEL: CPT

## 2019-08-16 PROCEDURE — 81001 URINALYSIS AUTO W/SCOPE: CPT

## 2019-08-16 PROCEDURE — 71250 CT THORAX DX C-: CPT

## 2019-08-16 PROCEDURE — 93010 ELECTROCARDIOGRAM REPORT: CPT

## 2019-08-16 PROCEDURE — 71046 X-RAY EXAM CHEST 2 VIEWS: CPT

## 2019-08-16 PROCEDURE — 99284 EMERGENCY DEPT VISIT MOD MDM: CPT

## 2019-08-16 PROCEDURE — 84484 ASSAY OF TROPONIN QUANT: CPT

## 2019-08-16 PROCEDURE — 36415 COLL VENOUS BLD VENIPUNCTURE: CPT

## 2019-08-16 PROCEDURE — 82962 GLUCOSE BLOOD TEST: CPT

## 2019-08-16 NOTE — ER DOCUMENT REPORT
ED General





- General


Chief Complaint: Numbness of Arm


Stated Complaint: ARM NUMBNESS


Time Seen by Provider: 08/16/19 10:42


Primary Care Provider: 


STEPHEN LYON MD [Primary Care Provider] - Follow up as needed


TRAVEL OUTSIDE OF THE [place] IN LAST 30 DAYS: No





- HPI


Notes: 





Patient presents with 3 days of intermittent numbness tingling of entire left 

arm and intermittent weakness.  She denies any history of heart attack stroke or

any other medical history.  Her mother did have a stroke but she is in her 60s. 

No recent cough congestion or fevers.  She states that her symptoms are 

intermittent and last anywhere from minutes to hours.





Upon further discussion, she states that she feels like she has a lump in her 

upper left chest wall that is painful to touch and when pressed her symptoms are

replicated.





- Related Data


Allergies/Adverse Reactions: 


                                        





No Known Allergies Allergy (Verified 08/16/19 09:52)


   











Past Medical History





- General


Information source: Patient





- Social History


Smoking Status: Current Every Day Smoker


Chew tobacco use (# tins/day): No


Frequency of alcohol use: None


Drug Abuse: None


Family History: Reviewed & Not Pertinent, DM


Patient has suicidal ideation: No


Patient has homicidal ideation: No





- Past Medical History


Cardiac Medical History: 


   Denies: Hx Coronary Artery Disease, Hx Heart Attack, Hx Hypertension


Pulmonary Medical History: 


   Denies: Hx Asthma, Hx Bronchitis, Hx COPD, Hx Pneumonia


Neurological Medical History: Denies: Hx Cerebrovascular Accident, Hx Seizures


Renal/ Medical History: Reports: Hx Ectopic Pregnancy.  Denies: Hx Peritoneal 

Dialysis


Musculoskeletal Medical History: Denies Hx Arthritis


Psychiatric Medical History: Reports: Hx Anxiety, Hx Depression


Past Surgical History: Reports: Hx Gynecologic Surgery, Other - Gynecologic proc

edure for ectopic





- Immunizations


Hx Diphtheria, Pertussis, Tetanus Vaccination: Yes





Review of Systems





- Review of Systems


Constitutional: No symptoms reported


EENT: No symptoms reported


Cardiovascular: No symptoms reported


Respiratory: No symptoms reported


Gastrointestinal: No symptoms reported


Genitourinary: No symptoms reported


Female Genitourinary: No symptoms reported


Musculoskeletal: No symptoms reported, See HPI


Skin: No symptoms reported


Hematologic/Lymphatic: No symptoms reported


Neurological/Psychological: No symptoms reported





Physical Exam





- Vital signs


Vitals: 


                                        











Temp Pulse Resp BP Pulse Ox


 


 97.8 F   61   14   113/69   100 


 


 08/16/19 10:08  08/16/19 10:08  08/16/19 10:08  08/16/19 10:08  08/16/19 10:08














- General


General appearance: Appears well, Alert





- HEENT


Head: Normocephalic


Eyes: Normal


Conjunctiva: Normal





- Respiratory


Respiratory status: No respiratory distress


Chest status: Other - Mild tenderness palpation of lateral upper chest wall with

no evidence of erythema or induration.  Appears to be her rib versus any type of

mass or lump is there is symmetry left versus right





- Cardiovascular


Rhythm: Regular


Heart sounds: Normal auscultation


Murmur: No





- Abdominal


Inspection: Normal


Distension: No distension


Bowel sounds: Normal





- Neurological


Notes: 





Patient appears to have weakened  strength on left versus right full range 

of motion with gross sensory appears intact.





Course





- Re-evaluation


Re-evalutation: 





08/16/19 11:57


Patient symptoms more consistent with radiculopathy at this time her tendons are

reproduced with palpation of her chest wall.  Will perform CT of chest this time

to assure no acute abnormalities.  Symptoms not consistent with CVA as symptoms 

are replicated with pressing on her chest wall.


08/16/19 13:25


CT shows no concerning findings.  The neuropathy is reproducible with palpation 

of upper left chest wall.  Discussed 5 days of steroids and follow-up with 

primary care physician for reevaluation.





- Vital Signs


Vital signs: 


                                        











Temp Pulse Resp BP Pulse Ox


 


 97.8 F   61   14   113/69   100 


 


 08/16/19 10:08  08/16/19 10:08  08/16/19 10:08  08/16/19 10:08  08/16/19 10:08














- Laboratory


Result Diagrams: 


                                 08/16/19 11:03





                                 08/16/19 11:03


Laboratory results interpreted by me: 


                                        











  08/16/19





  11:03


 


Potassium  3.4 L


 


BUN  2 L


 


Glucose  42 L


 


Total Protein  5.8 L














- EKG Interpretation by Me


EKG shows normal: Sinus rhythm


Rate: Normal - No concerning ST depressions or elevations


Rhythm: NSR





Discharge





- Discharge


Clinical Impression: 


 Radiculopathy affecting upper extremity





Condition: Good


Disposition: HOME, SELF-CARE


Prescriptions: 


Prednisone 40 mg PO DAILY 5 Days #10 tablet


Referrals: 


STEPHEN LYON MD [Primary Care Provider] - Follow up in 1 week (For 

reevaluation)

## 2019-08-16 NOTE — ER DOCUMENT REPORT
ED Medical Screen (RME)





- General


Chief Complaint: Numbness of Arm


Stated Complaint: ARM NUMBNESS


Time Seen by Provider: 08/16/19 10:42


Primary Care Provider: 


STEPHEN LYON MD [Primary Care Provider] - Follow up as needed


Information source: Patient


Notes: 





pt presents c/o left side chest lump for several months.  Patient states that 

she has had pain to the left side of her chest that radiates to the left arm for

the past 3 days off and on.  Patient complains of numbness intermittently to the

left upper extremity for the past 3 days as well.  Patient reports about 2-3 

episodes of numbness to the left arm each day.  Patient denies any shortness of 

breath nausea or vomiting.  Patient denies any difficulty breathing.





I have greeted and performed a rapid initial assessment of this patient.  A 

comprehensive ED assessment and evaluation of the patient, analysis of test 

results and completion of the medical decision making process will be conducted 

by additional ED providers.


TRAVEL OUTSIDE OF THE [place] IN LAST 30 DAYS: No





- Related Data


Allergies/Adverse Reactions: 


                                        





No Known Allergies Allergy (Verified 08/16/19 09:52)


   











Past Medical History





- Social History


Chew tobacco use (# tins/day): No


Frequency of alcohol use: None


Drug Abuse: None





- Past Medical History


Cardiac Medical History: 


   Denies: Hx Coronary Artery Disease, Hx Heart Attack, Hx Hypertension


Pulmonary Medical History: 


   Denies: Hx Asthma, Hx Bronchitis, Hx COPD, Hx Pneumonia


Neurological Medical History: Denies: Hx Cerebrovascular Accident, Hx Seizures


Renal/ Medical History: Reports: Hx Ectopic Pregnancy.  Denies: Hx Peritoneal 

Dialysis


Musculoskeltal Medical History: Denies Hx Arthritis


Psychiatric Medical History: Reports: Hx Anxiety, Hx Depression


Past Surgical History: Reports: Hx Gynecologic Surgery, Other - Gynecologic pr

ocedure for ectopic





- Immunizations


Hx Diphtheria, Pertussis, Tetanus Vaccination: Yes


History of Influenza Vaccine for 10/2017 - 3/2018 Season: No





Physical Exam





- Vital signs


Vitals: 





                                        











Temp Pulse Resp BP Pulse Ox


 


 97.8 F   61   14   113/69   100 


 


 08/16/19 10:08  08/16/19 10:08  08/16/19 10:08  08/16/19 10:08  08/16/19 10:08














- Respiratory


Respiratory status: No respiratory distress


Chest status: Tender


Breath sounds: Normal


Chest palpation: Tender - Palpable lump to left anterior chest wall, no 

overlying erythema





Course





- Vital Signs


Vital signs: 





                                        











Temp Pulse Resp BP Pulse Ox


 


 97.8 F   61   14   113/69   100 


 


 08/16/19 10:08  08/16/19 10:08  08/16/19 10:08  08/16/19 10:08  08/16/19 10:08














Doctor's Discharge





- Discharge


Referrals: 


STEPHEN LYON MD [Primary Care Provider] - Follow up as needed

## 2019-08-16 NOTE — RADIOLOGY REPORT (SQ)
EXAM DESCRIPTION:  CHEST 2 VIEWS



COMPLETED DATE/TIME:  8/16/2019 11:21 am



REASON FOR STUDY:  L chest pain



COMPARISON:  11/24/2017



EXAM PARAMETERS:  NUMBER OF VIEWS: two views

TECHNIQUE: Digital Frontal and Lateral radiographic views of the chest acquired.

RADIATION DOSE: NA

LIMITATIONS: none



FINDINGS:  LUNGS AND PLEURA: No opacities, masses or pneumothorax. No pleural effusion.

MEDIASTINUM AND HILAR STRUCTURES: No masses or contour abnormalities.

HEART AND VASCULAR STRUCTURES: Heart normal size.  No evidence for failure.

BONES: No acute findings.

HARDWARE: None in the chest.

OTHER: No other significant finding.



IMPRESSION:  No acute abnormality of the lungs.  No focal airspace opacity.



TECHNICAL DOCUMENTATION:  JOB ID:  [id number]

 2011 [employer]- All Rights Reserved



Reading location - IP/workstation name: [email or web address]

## 2019-08-16 NOTE — RADIOLOGY REPORT (SQ)
EXAM DESCRIPTION:  CT CHEST WITHOUT



COMPLETED DATE/TIME:  8/16/2019 12:14 pm



REASON FOR STUDY:  pain in L side of chest, concern for lump



COMPARISON:  None.



TECHNIQUE:  CT scan performed of the chest without intravenous contrast.  Images reviewed with lung, 
soft tissue and bone windows.  Reconstructed coronal and sagittal MPR images reviewed.  All images st
ored on PACS.

A oscar was placed on the skin of the anterior left chest at the level of a palpable lump.

All CT scanners at this facility use dose modulation, iterative reconstruction, and/or weight based d
osing when appropriate to reduce radiation dose to as low as reasonably achievable (ALARA).

[facility]: Dose Right  [facility]: CareDose    [facility]: Dose Right    [facility]: Teradose 4D    [facility]: Smart Technologies



RADIATION DOSE:  CT Rad equipment meets quality standard of care and radiation dose reduction techniq
ues were employed. CTDIvol: 4.8 mGy. DLP: 189 mGy-cm. mGy.



LIMITATIONS:  No technical limitations.



FINDINGS:  LUNGS AND PLEURA: No masses, infiltrates, or pneumothorax.  No pleural effusions or pleura
l calcifications.

HILAR AND MEDIASTINAL STRUCTURES: No identified masses or abnormal nodes.  No obvious aneurysm.

HEART AND VASCULAR STRUCTURES: No aneurysm.  No pericardial effusion.

UPPER ABDOMEN: No significant findings.  Limited exam.

THYROID AND OTHER SOFT TISSUES: No masses.  No adenopathy.

BONES: No significant finding.

HARDWARE: None in the chest.

OTHER: No other significant findings.



IMPRESSION:  NO SIGNIFICANT FINDING ON NON-CONTRASTED CHEST CT.  NO MASS OR ABNORMAL SOFT TISSUE FIND
INGS IN THE ANTERIOR LEFT CHEST AT THE LEVEL OF A PALPABLE LUMP.  THE PATIENT HAS A THIN BODY HABITUS
 AND THE PALPABLE FINDING MAY BE THE UNDERLYING RIB.



TECHNICAL DOCUMENTATION:  JOB ID:  [id number]

Quality ID # [id number]: Final reports with documentation of one or more dose reduction techniques (e.g., Au
tomated exposure control, adjustment of the mA and/or kV according to patient size, use of iterative 
reconstruction technique)

 2011 [employer]- All Rights Reserved



Reading location - IP/workstation name: [email or web address]